# Patient Record
Sex: FEMALE | Race: WHITE | NOT HISPANIC OR LATINO
[De-identification: names, ages, dates, MRNs, and addresses within clinical notes are randomized per-mention and may not be internally consistent; named-entity substitution may affect disease eponyms.]

---

## 2020-01-28 PROBLEM — Z00.00 ENCOUNTER FOR PREVENTIVE HEALTH EXAMINATION: Status: ACTIVE | Noted: 2020-01-28

## 2020-01-30 ENCOUNTER — APPOINTMENT (OUTPATIENT)
Dept: ORTHOPEDIC SURGERY | Facility: CLINIC | Age: 30
End: 2020-01-30
Payer: COMMERCIAL

## 2020-01-30 VITALS — HEIGHT: 61 IN | BODY MASS INDEX: 23.6 KG/M2 | WEIGHT: 125 LBS

## 2020-01-30 DIAGNOSIS — Z78.9 OTHER SPECIFIED HEALTH STATUS: ICD-10-CM

## 2020-01-30 DIAGNOSIS — M25.511 PAIN IN RIGHT SHOULDER: ICD-10-CM

## 2020-01-30 PROCEDURE — 20553 NJX 1/MLT TRIGGER POINTS 3/>: CPT

## 2020-01-30 PROCEDURE — 99203 OFFICE O/P NEW LOW 30 MIN: CPT | Mod: 25

## 2020-01-30 PROCEDURE — 73030 X-RAY EXAM OF SHOULDER: CPT | Mod: RT

## 2020-01-30 RX ORDER — SERTRALINE 25 MG/1
TABLET, FILM COATED ORAL
Refills: 0 | Status: ACTIVE | COMMUNITY

## 2020-01-30 NOTE — PHYSICAL EXAM
[de-identified] : PHYSICAL EXAM  RIGHT  SHOULDER\par \par SCAPULAR PROTRACTION\par AROM 140 / 140 / 90 / 30\par TENDER: LEVATOR ,  RHOMBOID, SERRATUS \par \par SPECIAL TESTING :\par IMPINGEMENT SIGNS NEGATIVE \par \par LOREDO - NEGATIVE \par APPREHENSION AND SUPPRESSION - NEGATIVE \par \par RC STRENGTH TESTING \par SS:  5/5\par SUB 5/5\par IS     5/5\par BICEPS  5/5\par \par SENSATION  - GROSSLY INTACT\par \par \par  [de-identified] : RIGHT SHOULDER XRAY -  \par NO OBVIOUS FRACTURE , NO SIGNIFICANT OSTEOARTHRITIS, SEPARATION OR DISLOCATION \par TYPE 2B ACROMION \par CSA=35.9\par

## 2020-01-30 NOTE — PROCEDURE
[de-identified] : TRIGGER POINT INJECTIONS\par \par Patient has demonstrated limited relief from NSAIDS, rest, exercises / PT, and after discussion of the risks and benefits, the patient elected to proceed with a trigger point injection into the \par \par RIGHT LEVATOR and RHOMBOID  and SERRATUS \par \par  I confirmed no prior adverse reactions, no active infections, and no relevant allergies. \par The skin was prepped in the usual sterile manner. The site was injected with local anesthetic followed by local needling. \par The injection was completed without complication and a bandage was applied.\par  \par The patient tolerated the procedure well and was given post-injection instructions. Cold Tx x 48 hours, analgesics. prn \par Medications: 1.5cc of 1% xylocaine + 1.5cc .25% Bupivicaine + KENALOG 1MG  per site\par

## 2020-01-30 NOTE — REVIEW OF SYSTEMS
[Joint Pain] : joint pain [Joint Stiffness] : joint stiffness [Depression] : depression [Muscle Weakness] : muscle weakness [Negative] : Heme/Lymph

## 2020-01-30 NOTE — DISCUSSION/SUMMARY
[de-identified] : POST INJECTION INSTRUCTIONS\par \par COLD THERAPY , IBUPROFEN 400- 800 PRN\par \par HOME STRETCHING AND EXERCISES QD\par \par START P.T.  2 WEEKS AFTER INJECTION \par \par

## 2020-01-30 NOTE — HISTORY OF PRESENT ILLNESS
[de-identified] : RIGHT SHOULDER\par POSTERIOR SHOULDER PAIN\par ONSET MARCH 2019, WORSE IN THE LAST 2 WEEKS\par PAIN LEVEL 2/10\par INTERMITTENT PAIN\par CRAMPING\par BETTER WITH ADVIL, MASSAGE\par WORSE WITH BENDING, REACHING BEHIND\par STIFFNESS

## 2020-08-24 ENCOUNTER — APPOINTMENT (OUTPATIENT)
Dept: ORTHOPEDIC SURGERY | Facility: CLINIC | Age: 30
End: 2020-08-24
Payer: COMMERCIAL

## 2020-08-24 VITALS — TEMPERATURE: 97.2 F

## 2020-08-24 DIAGNOSIS — M25.511 PAIN IN RIGHT SHOULDER: ICD-10-CM

## 2020-08-24 DIAGNOSIS — M75.81 OTHER SHOULDER LESIONS, RIGHT SHOULDER: ICD-10-CM

## 2020-08-24 PROCEDURE — 99214 OFFICE O/P EST MOD 30 MIN: CPT | Mod: 25

## 2020-08-24 PROCEDURE — 20611 DRAIN/INJ JOINT/BURSA W/US: CPT | Mod: RT

## 2020-08-24 PROCEDURE — 20552 NJX 1/MLT TRIGGER POINT 1/2: CPT | Mod: 59

## 2020-08-24 PROCEDURE — 76882 US LMTD JT/FCL EVL NVASC XTR: CPT | Mod: RT,59

## 2020-08-24 NOTE — PHYSICAL EXAM
[de-identified] : PHYSICAL EXAM  RIGHT  SHOULDER\par \par SCAPULAR PROTRACTION\par AROM 140 / 140 / 90 / 30\par TENDER: LEVATOR ,  RHOMBOID, SERRATUS \par \par SPECIAL TESTING :\par IMPINGEMENT SIGNS NEGATIVE \par \par LOREDO - NEGATIVE \par APPREHENSION AND SUPPRESSION - NEGATIVE \par \par RC STRENGTH TESTING \par SS:  5/5\par SUB 5/5\par IS     5/5\par BICEPS  5/5\par \par SENSATION  - GROSSLY INTACT\par \par \par

## 2020-08-24 NOTE — DISCUSSION/SUMMARY
[de-identified] : POST INJECTION INSTRUCTIONS\par \par COLD THERAPY , ANALGESICS IBUPROFEN PRN\par WATCH VIDEO ROT CUFF TENDONITIS AND TRIGGER POINS\par HOME STRETCHING AND EXERCISES QD - TIUW\par \par \par

## 2020-08-24 NOTE — HISTORY OF PRESENT ILLNESS
[de-identified] : RIGHT SHOULDER\par POSTERIOR SHOULDER PAIN\par FLARE UP 3-4 WEEKS AGO\par PAIN LEVEL 6/10\par INTERMITTENT PAIN\par WORSE AT NIGHT, LYING DOWN \par TRIGGER POINT INJECTIONS JANUARY 2020 AND PHYSICAL THERAPY HELPFUL

## 2020-08-24 NOTE — PROCEDURE
[de-identified] : DIAGNOSTIC ULTRASOUND RIGHT SHOULDER\par \par DIAGNOSTIC SONOGRAPHY of the Rotator Cuff Soft Tissue of the RIGHT SHOULDER was performed in Multiple Scan Planes with varying transducer frequencies.\par Imaging of the Supraspinatus Tendon reveals TENDONITIS, BURSITIS, ARTICULAR SIDE DEGENERATION  WITH OUT SIGNIFICANT / COMPLETE TEAR\par Imaging of the Biceps Tendon reveals no significant tear.\par Imaging of the Subscapularis Tendon reveals no significant tear.\par Imaging of the Infraspinatus Tendon reveals no significant tear.\par Key images were save digitally and reviewed with patient.\par \par \par INJECTION RIGHT SHOULDER SA SPACE\par \par Patient has demonstrated limited relief from NSAIDS, rest, exercises / PT, and after discussion of the risks and benefits, the patient has elected to proceed with an ULTRASOUND GUIDED injection into the RIGHT SUBACROMIAL  SPACE LATERAL APPROACH \par  \par Confirmed that the patient does not have history of prior adverse reactions, active, infections, or relevant allergies. There was no effusion, erythema, or warmth, and the skin was clear\par \par The skin was sterilized with alcohol. Ethyl Chloride was used as a topical anesthetic. Routine sterile technique. \par The site was injected UTILIZING ULTRASOUND GUIDANCE to confirm appropriate placement of the needle-\par with a mixture of medication and local anesthetic. The injection was completed without complication and a bandage was applied.\par  \par The patient tolerated the procedure well and was given post-injection instructions.Rec: Cold therapy, analgesics, avoid heavy activity.\par MEDICATION: 4cc of 1% xylocaine + 10mg of KENALOG\par \par \par TRIGGER POINT INJECTIONS\par \par Patient has demonstrated limited relief from NSAIDS, rest, exercises / PT, and after discussion of the risks and benefits, the patient elected to proceed with a trigger point injection into the \par RIGHT LEVATOR and RHOMBOID x2\par \par  I confirmed no prior adverse reactions, no active infections, and no relevant allergies. \par The skin was prepped in the usual sterile manner. The site was injected with local anesthetic followed by local needling. \par The injection was completed without complication and a bandage was applied.\par  \par The patient tolerated the procedure well and was given post-injection instructions. Cold Tx x 48 hours, analgesics. prn \par Medications: 1.5cc of 1% xylocaine + 1.5cc .25% Bupivicaine + KENALOG 1MG  per site\par